# Patient Record
Sex: MALE | Race: WHITE | Employment: STUDENT | URBAN - METROPOLITAN AREA
[De-identification: names, ages, dates, MRNs, and addresses within clinical notes are randomized per-mention and may not be internally consistent; named-entity substitution may affect disease eponyms.]

---
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
## 2023-10-28 NOTE — PROGRESS NOTES
04/22/21 3267   Patient Belongings   Did you bring any home meds/supplements to the hospital?  No   Patient Belongings locker   Patient Belongings Put in Hospital Secure Location (Security or Locker, etc.) clothing;cell phone/electronics;necklace;money (see comment);plastic bag   Belongings Search Yes   Clothing Search Yes   Second Staff Jayme Calvillo     Items in patient locker:  - cell phone with case  - blue pants  - black shirt  - pair of grey socks  - phone cord/plug  -wireless headphones and charging case  - beaded keychain  - pair of white shoes with laces  - gold colored necklace  - 10 cents  - plastic bag     NO wallet upon admission to     ADDENDUM 4/23/21  - light grey sweatpants  - Dark grey sweatpants  - 2 t-shirts  - 2 pair boxers  - 1 green hooded sweatshirt    A               Admission:  I am responsible for any personal items that are not sent to the safe or pharmacy.  [facility] is not responsible for loss, theft or damage of any property in my possession.    Signature:  _________________________________ Date: _______  Time: _____                                              Staff Signature:  ____________________________ Date: ________  Time: _____      2nd Staff person, if patient is unable/unwilling to sign:    Signature: ________________________________ Date: ________  Time: _____     Discharge:  [facility] has returned all of my personal belongings:    Signature: _________________________________ Date: ________  Time: _____                                          Staff Signature:  ____________________________ Date: ________  Time: _____            Him/He
